# Patient Record
Sex: FEMALE | Race: BLACK OR AFRICAN AMERICAN | Employment: PART TIME | URBAN - METROPOLITAN AREA
[De-identification: names, ages, dates, MRNs, and addresses within clinical notes are randomized per-mention and may not be internally consistent; named-entity substitution may affect disease eponyms.]

---

## 2017-02-03 ENCOUNTER — HOSPITAL ENCOUNTER (EMERGENCY)
Age: 42
Discharge: HOME | End: 2017-02-03
Attending: EMERGENCY MEDICINE
Payer: SELF-PAY

## 2017-02-03 VITALS
WEIGHT: 184.13 LBS | OXYGEN SATURATION: 98 % | TEMPERATURE: 98.4 F | HEART RATE: 84 BPM | RESPIRATION RATE: 16 BRPM | DIASTOLIC BLOOD PRESSURE: 92 MMHG | SYSTOLIC BLOOD PRESSURE: 157 MMHG | BODY MASS INDEX: 28.9 KG/M2 | HEIGHT: 67 IN

## 2017-02-03 DIAGNOSIS — A53.0 POSITIVE RPR TEST: Primary | ICD-10-CM

## 2017-02-03 LAB — RPR SER-TITR: ABNORMAL {TITER}

## 2017-02-03 PROCEDURE — 99281 EMR DPT VST MAYX REQ PHY/QHP: CPT

## 2017-02-03 PROCEDURE — 86592 SYPHILIS TEST NON-TREP QUAL: CPT | Performed by: EMERGENCY MEDICINE

## 2017-02-03 PROCEDURE — 86593 SYPHILIS TEST NON-TREP QUANT: CPT | Performed by: EMERGENCY MEDICINE

## 2017-02-03 PROCEDURE — 36415 COLL VENOUS BLD VENIPUNCTURE: CPT | Performed by: EMERGENCY MEDICINE

## 2017-02-03 PROCEDURE — 86780 TREPONEMA PALLIDUM: CPT | Performed by: EMERGENCY MEDICINE

## 2017-02-03 NOTE — ED TRIAGE NOTES
Pt stated she had blood work done 3 weeks ago that showed she had syphilis, pt stated she has been having headaches x 3 months, denies fever, denies n/v

## 2017-02-03 NOTE — ED PROVIDER NOTES
HPI Comments: 38 y.o. female with no significant past medical history who presents from home with chief complaint abnormal blood test. Pt states that she had a blood test done 3 weeks ago when she was donating plasma and they sent her a letter notifying her that she had syphilis. She was last sexually active 2 years ago and she says her only symptoms since then have been some some small red circles above her lip that she has had on and off for the last year. She denies any other rashes but says she has a small bald spot on the top of her head. She was seen recently at another hospital for her HA's she has had for the last 3 months and had a head CT that was negative. She also asked about her lip sores and was told they were most likely age marks after no blood tests she says. She does not have a HA and says that she feels good currently. There are no other acute medical concerns at this time. PCP: PROVIDER UNKNOWN    Note written by Jules Colby, as dictated by Mariama Prieto MD 9:03 AM      The history is provided by the patient. No  was used. History reviewed. No pertinent past medical history. Past Surgical History:   Procedure Laterality Date    Hx  section      Hx other surgical       tumor removed from breast          History reviewed. No pertinent family history. Social History     Social History    Marital status: SINGLE     Spouse name: N/A    Number of children: N/A    Years of education: N/A     Occupational History    Not on file. Social History Main Topics    Smoking status: Not on file    Smokeless tobacco: Not on file    Alcohol use Not on file    Drug use: Not on file    Sexual activity: Not on file     Other Topics Concern    Not on file     Social History Narrative    No narrative on file         ALLERGIES: Review of patient's allergies indicates no known allergies.     Review of Systems   Constitutional: Negative for appetite change, chills and fever. HENT: Negative for rhinorrhea, sore throat and trouble swallowing. Eyes: Negative for photophobia. Respiratory: Negative for cough and shortness of breath. Cardiovascular: Negative for chest pain and palpitations. Gastrointestinal: Negative for abdominal pain, nausea and vomiting. Genitourinary: Negative for dysuria, frequency and hematuria. Musculoskeletal: Negative for arthralgias. Skin:        Small red circles above lip   Neurological: Positive for headaches. Negative for dizziness, syncope and weakness. Psychiatric/Behavioral: Negative for behavioral problems. The patient is not nervous/anxious. All other systems reviewed and are negative. Vitals:    02/03/17 0830   BP: (!) 157/92   Pulse: (!) 122   Resp: 16   Temp: 98.4 °F (36.9 °C)   SpO2: 98%   Weight: 83.5 kg (184 lb 2 oz)   Height: 5' 7\" (1.702 m)            Physical Exam   Constitutional: She appears well-developed and well-nourished. HENT:   Head: Normocephalic and atraumatic. Mouth/Throat: Oropharynx is clear and moist.   Eyes: EOM are normal. Pupils are equal, round, and reactive to light. Neck: Normal range of motion. Neck supple. Cardiovascular: Normal rate, regular rhythm, normal heart sounds and intact distal pulses. Exam reveals no gallop and no friction rub. No murmur heard. Pulmonary/Chest: Effort normal. No respiratory distress. She has no wheezes. She has no rales. Abdominal: Soft. There is no tenderness. There is no rebound. Musculoskeletal: Normal range of motion. She exhibits no tenderness. Neurological: She is alert. No cranial nerve deficit. Motor; symmetric   Skin: No erythema. Several small scars between nose and upper lip   Psychiatric: She has a normal mood and affect. Her behavior is normal.   Nursing note and vitals reviewed.    Note written by Jules Gary, as dictated by Kishan Cuevas MD 9:03 AM      [facility]  ED Course       Procedures Patient called back today 2-4-17 and she was given her RPR results; she was told to call the [facility] health Department Monday which is in 2 days and tell them that her RPR was positive at [facility] and also another syphilis test was positive at a blood bank in [facility]; patient was told that she probably needs penicillin injections and if health Department can see her in the next week or 2 that should be fine; the patient was told to return to the [facility] ER if the health department cannot see her in a timely manner.   12:43 PM

## 2017-02-03 NOTE — DISCHARGE INSTRUCTIONS
We hope that we have addressed all of your medical concerns. The examination and treatment you received in the Emergency Department were for an emergent problem and were not intended as complete care. It is important that you follow up with your healthcare provider(s) for ongoing care. If your symptoms worsen or do not improve as expected, and you are unable to reach your usual health care provider(s), you should return to the Emergency Department. Today's healthcare is undergoing tremendous change, and patient satisfaction surveys are one of the many tools to assess the quality of medical care. You may receive a survey from the [employer] regarding your experience in the Emergency Department. I hope that your experience has been completely positive, particularly the medical care that I provided. As such, please participate in the survey; anything less than excellent does not meet my expectations or intentions. [facility] and [address] [place] [address] participate in nationally recognized quality of care measures. If your blood pressure is greater than 120/80, as reported below, we urge that you seek medical care to address the potential of high blood pressure, commonly known as hypertension. Hypertension can be hereditary or can be caused by certain medical conditions, pain, stress, or \"white coat syndrome. \"       Please make an appointment with your health care provider(s) for follow up of your Emergency Department visit. VITALS:   Patient Vitals for the past 8 hrs:   Temp Pulse Resp BP SpO2   02/03/17 0830 98.4 °F (36.9 °C) (!) 122 16 (!) 157/92 98 %          Thank you for allowing us to provide you with medical care today. We realize that you have many choices for your emergency care needs. Please choose us in the future for any continued health care needs.       Makenzie Brandt MD    [facility], [facility]. Office: [phone]            No results found for this or any previous visit (from the past 24 hour(s)). No results found.

## 2017-02-06 LAB
RPR SER QL: REACTIVE
T PALLIDUM AB SER QL IA: NEGATIVE

## 2017-03-10 ENCOUNTER — HOSPITAL ENCOUNTER (EMERGENCY)
Age: 42
Discharge: HOME | End: 2017-03-10
Attending: STUDENT IN AN ORGANIZED HEALTH CARE EDUCATION/TRAINING PROGRAM
Payer: SELF-PAY

## 2017-03-10 VITALS
SYSTOLIC BLOOD PRESSURE: 157 MMHG | HEIGHT: 67 IN | WEIGHT: 175 LBS | OXYGEN SATURATION: 95 % | TEMPERATURE: 97.9 F | BODY MASS INDEX: 27.47 KG/M2 | RESPIRATION RATE: 18 BRPM | DIASTOLIC BLOOD PRESSURE: 103 MMHG | HEART RATE: 117 BPM

## 2017-03-10 DIAGNOSIS — R11.2 NAUSEA AND VOMITING, INTRACTABILITY OF VOMITING NOT SPECIFIED, UNSPECIFIED VOMITING TYPE: Primary | ICD-10-CM

## 2017-03-10 PROCEDURE — 99282 EMERGENCY DEPT VISIT SF MDM: CPT

## 2017-03-10 NOTE — ED NOTES
\" Ma-am I don't want to die, I cannot feel the left side of my face, my left eye is tight. I am nauseous all the time if I don't eat or even when I eat, but I'm not nauseous right now\". \" I was told a month ago I had syphllis I followed up with the Health Department and they told me it was negative but my titers are high\". \" I have bald spots in my head ,but nobody wants to take the time to pull my hair apart and look at them\".

## 2017-03-10 NOTE — ED TRIAGE NOTES
States she tested positive syphilis month ago and was told to follow up with Rheumatologist, Infectious Disease, and health department. \"I am scared for my life. \"  Has only followed up with health department. Here today for nausea x 1 month.

## 2017-03-10 NOTE — ED NOTES
Discharge instructions given to pt. All questions answered and pt verbalized understanding. Patient left prior to vital signs being obtained. Pt ambulatory out of unit.

## 2017-03-10 NOTE — ED PROVIDER NOTES
HPI Comments: 38 y.o. female with no significant past medical history who presents from home with chief complaint of left side HA. Pt reports that over the past few weeks she has had several different muscle pains and joint pains, most recently she has had 3 days of left side headache. She reports that she was seen at the health department recently and was told that she had some emma titers that she need to see a rheumatologist to have further evaluated, but in light of her recent intermittent left side headaches she came to the ED for evaluation and more information about the next steps in her work up. She denies vision changes, nausea, vomiting, dizziness, recent trauma. There are no other acute medical concerns at this time. PCP: PROVIDER UNKNOWN    Note written by guille Pascual, as dictated by Su Nichole MD 12:25 PM           History reviewed. No pertinent past medical history. Past Surgical History:   Procedure Laterality Date    HX  SECTION      HX OTHER SURGICAL      tumor removed from breast          History reviewed. No pertinent family history. Social History     Social History    Marital status: SINGLE     Spouse name: N/A    Number of children: N/A    Years of education: N/A     Occupational History    Not on file. Social History Main Topics    Smoking status: Current Every Day Smoker     Packs/day: 0.50    Smokeless tobacco: Not on file    Alcohol use No    Drug use: Yes     Special: Marijuana    Sexual activity: Not on file     Other Topics Concern    Not on file     Social History Narrative         ALLERGIES: Review of patient's allergies indicates no known allergies. Review of Systems   Constitutional: Negative for activity change, diaphoresis, fatigue and fever. HENT: Negative for congestion and sore throat. Eyes: Negative for photophobia and visual disturbance. Respiratory: Negative for chest tightness and shortness of breath. Cardiovascular: Negative for chest pain, palpitations and leg swelling. Gastrointestinal: Negative for abdominal pain, blood in stool, constipation, diarrhea, nausea and vomiting. Genitourinary: Negative for difficulty urinating, dysuria, flank pain, frequency and hematuria. Musculoskeletal: Negative for back pain. Neurological: Positive for headaches. Negative for dizziness, syncope and numbness. Vitals:    03/10/17 1021   BP: (!) 157/103   Pulse: (!) 117   Resp: 18   Temp: 97.9 °F (36.6 °C)   SpO2: 95%   Weight: 79.4 kg (175 lb)   Height: 5' 7\" (1.702 m)            Physical Exam   Constitutional: She is oriented to person, place, and time. She appears well-developed and well-nourished. No distress. HENT:   Head: Normocephalic and atraumatic. Nose: Nose normal.   Mouth/Throat: Oropharynx is clear and moist. No oropharyngeal exudate. Eyes: Conjunctivae and EOM are normal. Right eye exhibits no discharge. Left eye exhibits no discharge. No scleral icterus. Neck: Normal range of motion. Neck supple. No JVD present. No tracheal deviation present. No thyromegaly present. Cardiovascular: Normal rate, regular rhythm, normal heart sounds and intact distal pulses. Exam reveals no gallop and no friction rub. No murmur heard. Pulmonary/Chest: Effort normal and breath sounds normal. No stridor. No respiratory distress. She has no wheezes. She has no rales. She exhibits no tenderness. Abdominal: Bowel sounds are normal. She exhibits no distension and no mass. There is no tenderness. There is no rebound. Musculoskeletal: Normal range of motion. She exhibits no edema or tenderness. Lymphadenopathy:     She has no cervical adenopathy. Neurological: She is alert and oriented to person, place, and time. No cranial nerve deficit. Coordination normal.   Skin: Skin is warm and dry. No rash noted. She is not diaphoretic. No erythema. No pallor. Psychiatric: She has a normal mood and affect.  Her behavior is normal. Judgment and thought content normal.    Note written by guille Pascual, as dictated by Su Nichole MD 12:27 PM      MDM  Number of Diagnoses or Management Options  Nausea and vomiting, intractability of vomiting not specified, unspecified vomiting type:   Diagnosis management comments: N/V, abd pain, HA, psychosis. 39 y/o presenting with multiple complaints on exam, the most is that \" I have syphillis and they told me I have high titers. \"  Will obtain records from health department, patient also complaining of n/v. Will await health dept records and reasess. Amount and/or Complexity of Data Reviewed  Clinical lab tests: reviewed  Review and summarize past medical records: yes    Risk of Complications, Morbidity, and/or Mortality  Presenting problems: moderate  Diagnostic procedures: moderate  Management options: moderate    Patient Progress  Patient progress: stable    ED Course       Procedures  12:46 PM  Reviewed records from [facility] which showed reactive RPR, but non-reactive TP-PA. Based on literature guide lines from [facility] and in setting of patient's recent clinical history, this is indicative of recently treated syphillis infection.

## 2017-03-10 NOTE — DISCHARGE INSTRUCTIONS
We hope that we have addressed all of your medical concerns. The examination and treatment you received in the Emergency Department were for an emergent problem and were not intended as complete care. It is important that you follow up with your healthcare provider(s) for ongoing care. If your symptoms worsen or do not improve as expected, and you are unable to reach your usual health care provider(s), you should return to the Emergency Department. Today's healthcare is undergoing tremendous change, and patient satisfaction surveys are one of the many tools to assess the quality of medical care. You may receive a survey from the [employer] regarding your experience in the Emergency Department. I hope that your experience has been completely positive, particularly the medical care that I provided. As such, please participate in the survey; anything less than excellent does not meet my expectations or intentions. [facility] and [address] [place] [address] participate in nationally recognized quality of care measures. If your blood pressure is greater than 120/80, as reported below, we urge that you seek medical care to address the potential of high blood pressure, commonly known as hypertension. Hypertension can be hereditary or can be caused by certain medical conditions, pain, stress, or \"white coat syndrome. \"       Please make an appointment with your health care provider(s) for follow up of your Emergency Department visit. VITALS:   Patient Vitals for the past 8 hrs:   Temp Pulse Resp BP SpO2   03/10/17 1021 97.9 °F (36.6 °C) (!) 117 18 (!) 157/103 95 %          Thank you for allowing us to provide you with medical care today. We realize that you have many choices for your emergency care needs. Please choose us in the future for any continued health care needs. Nava Crespo, [address] [place] [address] [address] Office: [phone]            No results found for this or any previous visit (from the past 24 hour(s)). No results found. Nausea and Vomiting: Care Instructions  Your Care Instructions    When you are nauseated, you may feel weak and sweaty and notice a lot of saliva in your mouth. Nausea often leads to vomiting. Most of the time you do not need to worry about nausea and vomiting, but they can be signs of other illnesses. Two common causes of nausea and vomiting are stomach flu and food poisoning. Nausea and vomiting from viral stomach flu will usually start to improve within 24 hours. Nausea and vomiting from food poisoning may last from 12 to 48 hours. The doctor has checked you carefully, but problems can develop later. If you notice any problems or new symptoms, get medical treatment right away. Follow-up care is a key part of your treatment and safety. Be sure to make and go to all appointments, and call your doctor if you are having problems. It's also a good idea to know your test results and keep a list of the medicines you take. How can you care for yourself at home? · To prevent dehydration, drink plenty of fluids, enough so that your urine is light yellow or clear like water. Choose water and other caffeine-free clear liquids until you feel better. If you have kidney, heart, or liver disease and have to limit fluids, talk with your doctor before you increase the amount of fluids you drink. · Rest in bed until you feel better. · When you are able to eat, try clear soups, mild foods, and liquids until all symptoms are gone for 12 to 48 hours. Other good choices include dry toast, crackers, cooked cereal, and gelatin dessert, such as Jell-O. When should you call for help? Call 911 anytime you think you may need emergency care. For example, call if:  · You passed out (lost consciousness).   Call your doctor now or seek immediate medical care if:  · You have symptoms of dehydration, such as:  ¨ Dry eyes and a dry mouth. ¨ Passing only a little dark urine. ¨ Feeling thirstier than usual.  · You have new or worsening belly pain. · You have a new or higher fever. · You vomit blood or what looks like coffee grounds. Watch closely for changes in your health, and be sure to contact your doctor if:  · You have ongoing nausea and vomiting. · Your vomiting is getting worse. · Your vomiting lasts longer than 2 days. · You are not getting better as expected. Where can you learn more? Go to http://daniela-dell.info/. Enter 25 [id number] in the search box to learn more about \"Nausea and Vomiting: Care Instructions. \"  Current as of: May 27, 2016  Content Version: 11.1  © 2669-9094 [employer]. Care instructions adapted under license by [employer] (which disclaims liability or warranty for this information). If you have questions about a medical condition or this instruction, always ask your healthcare professional. [place] [address] any warranty or liability for your use of this information.

## 2017-03-15 ENCOUNTER — TELEPHONE (OUTPATIENT)
Dept: RHEUMATOLOGY | Age: 42
End: 2017-03-15

## 2017-03-16 ENCOUNTER — OFFICE VISIT (OUTPATIENT)
Dept: RHEUMATOLOGY | Age: 42
End: 2017-03-16

## 2017-03-16 VITALS
SYSTOLIC BLOOD PRESSURE: 128 MMHG | RESPIRATION RATE: 18 BRPM | BODY MASS INDEX: 27.94 KG/M2 | OXYGEN SATURATION: 94 % | DIASTOLIC BLOOD PRESSURE: 87 MMHG | HEIGHT: 67 IN | HEART RATE: 83 BPM | TEMPERATURE: 97.4 F | WEIGHT: 178 LBS

## 2017-03-16 DIAGNOSIS — R53.82 CHRONIC FATIGUE: ICD-10-CM

## 2017-03-16 DIAGNOSIS — Z72.0 TOBACCO ABUSE: ICD-10-CM

## 2017-03-16 DIAGNOSIS — M35.9 UNDIFFERENTIATED CONNECTIVE TISSUE DISEASE (HCC): Primary | ICD-10-CM

## 2017-03-16 DIAGNOSIS — L65.9 NON-SCARRING ALOPECIA: ICD-10-CM

## 2017-03-16 DIAGNOSIS — M25.511 STERNOCLAVICULAR JOINT PAIN, RIGHT: ICD-10-CM

## 2017-03-16 DIAGNOSIS — L30.9 DERMATITIS: ICD-10-CM

## 2017-03-16 DIAGNOSIS — R76.8 BIOLOGICAL FALSE POSITIVE RPR TEST: ICD-10-CM

## 2017-03-16 NOTE — MR AVS SNAPSHOT
Visit Information Date & Time Provider Department Dept. Phone Encounter #  
 3/16/2017 11:00 AM Shelley Lara MD [facility] [id number] Follow-up Instructions Return in about 2 weeks (around 3/30/2017). Upcoming Health Maintenance Date Due Pneumococcal 19-64 Medium Risk (1 of 1 - PPSV23) 1/3/1992 DTaP/Tdap/Td series (1 - Tdap) 1/3/1994 PAP AKA CERVICAL CYTOLOGY 1/3/1994 INFLUENZA AGE 9 TO ADULT 8/1/2016 Allergies as of 3/16/2017  Review Complete On: 3/16/2017 By: Shelley Lara MD  
 No Known Allergies Current Immunizations  Never Reviewed No immunizations on file. Not reviewed this visit You Were Diagnosed With   
  
 Codes Comments Undifferentiated connective tissue disease ([address]    -  Primary ICD-10-CM: M35.9 ICD-9-CM: 710.9 Biological false positive RPR test     ICD-10-CM: R76.8 ICD-9-CM: 795.6 Non-scarring alopecia     ICD-10-CM: L65.9 ICD-9-CM: 704.09 Dermatitis     ICD-10-CM: L30.9 ICD-9-CM: 692.9 Sternoclavicular joint pain, right     ICD-10-CM: M25.511 ICD-9-CM: 719.41 Tobacco abuse     ICD-10-CM: Z72.0 ICD-9-CM: 305.1 Vitals BP Pulse Temp Resp Height(growth percentile) Weight(growth percentile) 128/87 (BP 1 Location: Right arm, BP Patient Position: Sitting) 83 97.4 °F (36.3 °C) 18 5' 7\" (1.702 m) 178 lb (80.7 kg) SpO2 BMI OB Status Smoking Status 94% 27.88 kg/m2 Postmenopausal Current Every Day Smoker BMI and BSA Data Body Mass Index Body Surface Area  
 27.88 kg/m 2 1.95 m 2 Your Updated Medication List  
  
Notice  As of 3/16/2017 11:56 AM  
 You have not been prescribed any medications. We Performed the Following ANTI-SMOOTH MUSCLE/MITOCHOND. [02663 CPT(R)] ANTINUCLEAR ANTIBODIES, IFA [id number] CPT(R)] BETA-2 GLYCOPROTEIN I ABS [id number] CPT(R)] C REACTIVE PROTEIN, QT [94351 CPT(R)] CARDIOLIPIN AB PANEL [id number] CPT(R)] CBC WITH AUTOMATED DIFF [55391 CPT(R)] CENTROMERE B AB [id number] Custom] CHOLESTEROL, TOTAL [67218 CPT(R)] CHRONIC HEPATITIS PANEL [[id number] Custom] COMPLEMENT, C3 & C4 [id number] Custom] COMPLEMENT, CH50, TOTAL [60977 CPT(R)] DHEA [54317 CPT(R)] DIRECT ALLISON [id number] CPT(R)] DSDNA (NDNA) SCRN BY CLAUDINE [[id number] Custom] HISTONE AB [id number] CPT(R)] HOMOCYST(E)INE, PLASMA [id number] CPT(R)] IMMUNOGLOBULINS, G/A/M, QT. [59765 CPT(R)] METABOLIC PANEL, COMPREHENSIVE [76775 CPT(R)] PROT+CREATU (RANDOM) [id number] CPT(R)] PROTEIN ELECTROPHORESIS W/ REFLX LEI [[id number] Custom] RNP ANTIBODIES [id number] CPT(R)] RPR W/REFLEX TITER AND CONFIRMATION [[id number] Custom] SED RATE (ESR) V4019046 CPT(R)] SJOGREN'S ABS, SSA AND SSB [[id number] Custom] SMITH ANTIBODIES [[id number] Custom] UA/M W/RFLX CULTURE, ROUTINE [[id number] Custom] VITAMIN D, 25 HYDROXY [id number] CPT(R)] Follow-up Instructions Return in about 2 weeks (around 3/30/2017). To-Do List   
 03/16/2017 Imaging:  MRI CHEST W WO CONT   
  
 03/16/2017 Imaging:  XR CHEST PA LAT Patient Instructions Labs today X-rays today I ordered an MRI of your chest. Please call the Patient Care Scheduling Team [phone] to schedule your test. 
 
 
  
Introducing [facility]! Davonte Amado introduces [employer] patient portal. Now you can access parts of your medical record, email your doctor's office, and request medication refills online. 1. In your internet browser, go to [email or web address] 2. Click on the First Time User? Click Here link in the Sign In box. You will see the New Member Sign Up page. 3. Enter your MyChart Access Code exactly as it appears below. You will not need to use this code after youve completed the sign-up process. If you do not sign up before the expiration date, you must request a new code. · [employer] Access Code: [email or web address] Expires: 5/4/2017  9:47 AM 
 
4. Enter the last four digits of your Social Security Number (xxxx) and Date of Birth (mm/dd/yyyy) as indicated and click Submit. You will be taken to the next sign-up page. 5. Create a [employer] ID. This will be your [employer] login ID and cannot be changed, so think of one that is secure and easy to remember. 6. Create a [employer] password. You can change your password at any time. 7. Enter your Password Reset Question and Answer. This can be used at a later time if you forget your password. 8. Enter your e-mail address. You will receive e-mail notification when new information is available in [address] 9. Click Sign Up. You can now view and download portions of your medical record. 10. Click the Download Summary menu link to download a portable copy of your medical information. If you have questions, please visit the Frequently Asked Questions section of the [employer] website. Remember, [employer] is NOT to be used for urgent needs. For medical emergencies, dial 911. Now available from your iPhone and Android! Please provide this summary of care documentation to your next provider. Your primary care clinician is listed as PROVIDER UNKNOWN. If you have any questions after today's visit, please call [phone].

## 2017-03-16 NOTE — PATIENT INSTRUCTIONS
Labs today    X-rays today    I ordered an MRI of your chest. Please call the Patient Care Scheduling Team [phone] to schedule your test.

## 2017-03-16 NOTE — PROGRESS NOTES
REASON FOR VISIT    This is the initial evaluation for Ms. Lawyer Nieves a 38 y.o.  female for question of a systemic lupus erythematosus. The patient is referred to the [facility] and [address] [place] [address] at the request of Dr. Serina Prader. HISTORY OF PRESENT ILLNESS      Records reviewed from [address] [place] [address]. In 10/02/2011, chest radiograph showed the heart is not enlarged.  The mediastinal structures are normal and the lung   fields appear clear.  Previously noted right perihilar infiltrate has resolved. In 7/01/2014, labs showed negative HBsAb and Quantiferon TB    In 7/20/2016, CT Head without contrast done due to headaches showed there is no acute intracranial hemorrhage, midline shift, mass effect or extra-axial fluid collection. Gray-white differentiation is preserved without CT evidence for acute territorial infarction. Brain volume and ventricular system are within normal limits for age. The skull base and calvarium are intact. Partial opacification right sphenoid sinus present. The visualized orbits, globes, and mastoid air cells are unremarkable. She reports a history of small red circles above her lip that waxed and wanted over the past year and a small bald spot on the top of her head. In 2015, she developed red lesions on her upper lips that could be pruritic. She also had under her left eye. These lesions are constant. No known exacerbating factors. Sunlight does not effect it. She also notes having small red lesions that erupt on her arms. In 10/09/2015, a thyroid ultrasound showed Right lobe: 3.8 cm x 1.6 cm x 1.5 cm. There is scattered small nodules.  There is a 1.0 cm nodule in the upper pole. There is a cystic process adjacent to the lower pole of the right lobe. This measures 1.7 cm.  The etiology of this is uncertain.  I would suggest follow up with CT scan of the neck with contrast. Left lobe:  4.2 cm x 1.5 cm x 1.4 cm.  There is a tiny nodule in the mid pole measuring 0.2 cm. Isthmus:  0.42 cm. Mammogram showed there are scattered areas of fibroglandular densities. There is an oval mass measuring 7 millimeters with obscured margins in the middle region of the  left breast lower outer quadrant at 4 o'clock. In the right breast, no suspicious masses, calcifications or other abnormalities are seen a mass in the left breast requires additional evaluation.      In 5/2016, she developed a tender lesion on posterior scalp. She also developed right sternoclavicular pain and swelling. In 6/2016, her grand daugther was fixing her hair and noticed a bald spot. In 2/03/2017, labs showed positive RPR 1:16, with negative TP-PA    In 2/27/2017, labs showed negative HIV, positive RPR 1:32 with negative TP-PA    Intermittentli she develops a painful tightness/numbness on her left side of her face. Arlyn Slay exposure makes that worse. She is works a CNA at a nursing aide. She is a smoker since the age of 13    REVIEW OF SYSTEMS    A 15 point review of systems was performed and summarized below. The questionnaire was reviewed with the patient and scanned into the patient's medical record.     General: endorses fatigue, weakness, denies recent weight gain, recent weight loss, fever, night sweats  Musculoskeletal: denies morning stiffness, joint pain, joint swelling, muscle weakness  Ears: denies ringing in ears, loss of hearing  Eyes: denies pain, redness, loss of vision, double vision, blurred vision, dryness, foreign body sensation  Mouth: denies sore tongue, oral ulcers, bleeding gums, loss of taste, dryness, increased dental caries  Nose: denies nosebleeds, loss of smell, nasal ulcers  Throat: endorses hoarseness, denies frequent sore throats, difficulty in swallowing, pain in jaw while chewing  Neck: denies swollen glands, tender glands  Cardiopulmonary: endorses sudden changes in heart beat, swollen legs or feet, cough, wheezing, denies pain in chest, irregular heart beat, shortness of breath, difficulty breathing at night, coughing of blood  Gastrointestinal: endorses nausea, denies heartburn, stomach pain relieved by food, vomiting of blood/\"coffee grounds\", jaundice, increasing constipation, persistent diarrhea, blood in stools, black stools  Genitourinary: endorses getting up at night to pass urine, denies difficult urination, pain or burning on urination, blood in urine, cloudy urine, pus in urine, genital discharge, frequent urination, vaginal dryness, rash/ulcers, sexual difficulties   Hematologic: denies anemia, bleeding tendency, blood clots  Skin: endorses hives, skin tightness, nodules/bumps, hair loss, denies easy bruising, redness, rash, sun sensitive, color changes of hands or feet in the cold (Raynaud's)  Neurologic: endorses headaches, dizziness, numbness or tingling in hands/feet, muscle weakness, denies memory loss,  fainting of loss of consciousness  Psychiatric: endorses depression, excessive worries  Sleep: endorses poor sleep (5-6 hours), snoring, daytime somnolence, difficulty falling asleep, denies difficulty staying asleep     PAST MEDICAL HISTORY    She has no past medical history on file. FAMILY HISTORY    Her family history includes Diabetes in her mother and paternal grandmother; Hypertension in her paternal grandmother and sister. SOCIAL HISTORY    She reports that she has been smoking. She has been smoking about 0.50 packs per day. She does not have any smokeless tobacco history on file. She reports that she uses illicit drugs, including Marijuana. She reports that she does not drink alcohol. GYNECOLOGIC HISTORY     8, Para 5, Living 5, Miscarriage 3 @ 2 around 6 weeks and 1 arounds 6 months    She denies severe pre-eclampsia, eclampsia or placental insufficiency    HEALTH MAINTENANCE    Immunizations    There is no immunization history on file for this patient.     MEDICATIONS    None    ALLERGIES    No Known Allergies    PHYSICAL EXAMINATION    Visit Vitals    /87 (BP 1 Location: Right arm, BP Patient Position: Sitting)    Pulse 83    Temp 97.4 °F (36.3 °C)    Resp 18    Ht 5' 7\" (1.702 m)    Wt 178 lb (80.7 kg)    SpO2 94%    BMI 27.88 kg/m2     Body mass index is 27.88 kg/(m^2). General: Patient is alert, oriented x 3, not in acute distress    HEENT:   Conjunctiva are not injected and appear moist, oral mucous membranes are moist, there are no ulcers present, there is non-scarring alopecia, neck is supple, there is no lymphadenopathy    Cardiovascular:  Heart is regular rate and rhythm, no murmurs. Chest:  Lungs are clear to auscultation bilaterally. Abdomen:  Soft, non-tender    Extremities:  Free of clubbing, cyanosis, edema, extremities well perfused. Neurological exam:  No focal sensory deficits, muscle strength is full in upper and lower extremities. Skin exam:  There are no active Raynaud's, no livedo reticularis, no periungual erythema. Non-scarring alopecia. 3/16/2017. Courtesy of Aniyah Parikh MD, [facility] [place]    Musculoskeletal exam:  A comprehensive musculoskeletal exam was performed for all joints of each upper and lower extremity and assessed for swelling, tenderness and range of motion. Pertinent results are documented as below:    Right SC joint swelling and tenderness. Enlarged and Tender Right SC Joint. 3/16/2017. Courtesy of Aniyah Parikh MD, [facility] [place]      Enlarged and Tender Right SC Joint. 3/16/2017. Courtesy of Aniyah Parikh MD, [facility] [place]    DATA REVIEW    Studies Reviewed:     Prior medical records were reviewed and are summarized as below:    Laboratory data: summarized in the HPI    Imaging: summarized in the HPI. ASSESSMENT AND PLAN    1) Undifferentiated Connective Tissue Disease. The diagnosis of undifferentiated connective tissue disease is based on her non-scarring alopecia, false positive RPR, right sternoclavicular swelling,.  She does not meet 4 of the 11 of [employer] of Rheumatology criteria or SLICC classification criteria for systemic lupus erythematosus (SLE). Approximately 10% of patients with undifferentiated connective tissue disease can progress to systemic lupus erythematosus. Early initiation of hydroxychloroquine has been associated with delayed SLE onset Mounika Stanton et al. Lupus 2007). Nabil Almanza 2) False Positive RPR. She does not have syphilis. This an immunogenic positive. See #1. 3) Right Sternoclavicular Joint Swelling. I ordered a chest radiograph and MRI. 4) Breast Mass. This was seen in 10/2015. She needs follow up imaging. 5) Chronic Fatigue. I will check her TSH. 6) Tobacco Abuse. She is a long standing smoker. I counseled cessation. 7) Dermatitis. I am uncertain of the etiology of her rash. It does not fit acute cutaneous lupus. The patient voiced understanding of the aforementioned assessment and plan. Summary of plan was provided in the After Visit Summary patient instructions. I also provided education about MyChart setup and utility. TODAY'S ORDERS    Orders Placed This Encounter    XR CHEST PA LAT    MRI CHEST W WO CONT    ANTINUCLEAR ANTIBODIES, IFA    CENTROMERE B AB    HISTONE AB    RNP ANTIBODIES    SJOGREN'S ABS, SSA AND SSB    SMITH ANTIBODIES    ANTI-SMOOTH MUSCLE/MITOCHOND.     COMPLEMENT, CH50, TOTAL    CHRONIC HEPATITIS PANEL    DHEA    BETA-2 GLYCOPROTEIN I ABS    CARDIOLIPIN AB PANEL    DSDNA (NDNA) SCRN BY CRITHIDIA    COMPLEMENT, C3 & C4    CBC WITH AUTOMATED DIFF    CHOLESTEROL, TOTAL    METABOLIC PANEL, COMPREHENSIVE    C REACTIVE PROTEIN, QT    SED RATE (ESR)    HOMOCYST(E)INE, PLASMA    RPR W/REFLEX TITER AND CONFIRMATION    IMMUNOGLOBULINS, G/A/M, QT.    PROTEIN ELECTROPHORESIS W/ REFLX LEI    UA/M W/RFLX CULTURE, ROUTINE    PROT+CREATU (RANDOM)    VITAMIN D, 25 HYDROXY    TSH REFLEX TO T4    THYROID ANTIBODY PANEL    DIRECT ALLISON     Future Appointments  Date Time Provider Department Center   4/3/2017 1:40 PM MD Radha Sprague MD, [facility]    Adult Rheumatology   Musculoskeletal Ultrasound Certified  [address] [place] [address]   [id number] [place] [address], [address] [place] [address] Road   Phone [phone]  Fax [phone]

## 2017-03-17 LAB
APPEARANCE UR: CLEAR
BACTERIA #/AREA URNS HPF: NORMAL /[HPF]
BILIRUB UR QL STRIP: NEGATIVE
CASTS URNS QL MICRO: NORMAL /LPF
CH50 SERPL-ACNC: >60 U/ML (ref 42–60)
COLOR UR: YELLOW
CREAT UR-MCNC: 42.5 MG/DL
EPI CELLS #/AREA URNS HPF: NORMAL /HPF
GLUCOSE UR QL: NEGATIVE
HGB UR QL STRIP: NEGATIVE
KETONES UR QL STRIP: NEGATIVE
LEUKOCYTE ESTERASE UR QL STRIP: NEGATIVE
MICRO URNS: NORMAL
MICRO URNS: NORMAL
NITRITE UR QL STRIP: NEGATIVE
PH UR STRIP: 6.5 [PH] (ref 5–7.5)
PROT UR QL STRIP: NEGATIVE
PROT UR-MCNC: 4.9 MG/DL
PROT/CREAT UR: 115 MG/G CREAT (ref 0–200)
RBC #/AREA URNS HPF: NORMAL /HPF
SP GR UR: 1.01 (ref 1–1.03)
URINALYSIS REFLEX, 377202: NORMAL
UROBILINOGEN UR STRIP-MCNC: 0.2 MG/DL (ref 0.2–1)
WBC #/AREA URNS HPF: NORMAL /HPF

## 2017-03-23 ENCOUNTER — TELEPHONE (OUTPATIENT)
Dept: RHEUMATOLOGY | Age: 42
End: 2017-03-23

## 2017-03-24 NOTE — TELEPHONE ENCOUNTER
Spoke with pt and let her know that her lab results have not all resulted yet and that we will call her with anything abnormal once Dr. Emmy Wright reviews them. She stated an understanding and gave me her new address to update in the system.

## 2017-03-31 LAB
25(OH)D3+25(OH)D2 SERPL-MCNC: 21.6 NG/ML (ref 30–100)
ACTIN IGG SERPL-ACNC: 19 UNITS (ref 0–19)
ALBUMIN SERPL ELPH-MCNC: 3.7 G/DL (ref 2.9–4.4)
ALBUMIN SERPL-MCNC: 4.4 G/DL (ref 3.5–5.5)
ALBUMIN/GLOB SERPL: 1 {RATIO} (ref 0.7–1.7)
ALBUMIN/GLOB SERPL: 1.5 {RATIO} (ref 1.2–2.2)
ALP SERPL-CCNC: 84 IU/L (ref 39–117)
ALPHA1 GLOB SERPL ELPH-MCNC: 0.3 G/DL (ref 0–0.4)
ALPHA2 GLOB SERPL ELPH-MCNC: 0.9 G/DL (ref 0.4–1)
ALT SERPL-CCNC: 14 IU/L (ref 0–32)
ANA TITR SER IF: NEGATIVE {TITER}
AST SERPL-CCNC: 18 IU/L (ref 0–40)
B-GLOBULIN SERPL ELPH-MCNC: 1.2 G/DL (ref 0.7–1.3)
B2 GLYCOPROT1 IGA SER-ACNC: <9 GPI IGA UNITS (ref 0–25)
B2 GLYCOPROT1 IGG SER-ACNC: <9 GPI IGG UNITS (ref 0–20)
B2 GLYCOPROT1 IGM SER-ACNC: <9 GPI IGM UNITS (ref 0–32)
BASOPHILS # BLD AUTO: 0.1 X10E3/UL (ref 0–0.2)
BASOPHILS NFR BLD AUTO: 1 %
BILIRUB SERPL-MCNC: 0.2 MG/DL (ref 0–1.2)
BUN SERPL-MCNC: 8 MG/DL (ref 6–24)
BUN/CREAT SERPL: 13 (ref 9–23)
C3 SERPL-MCNC: 131 MG/DL (ref 82–167)
C4 SERPL-MCNC: 23 MG/DL (ref 14–44)
CALCIUM SERPL-MCNC: 9.8 MG/DL (ref 8.7–10.2)
CARDIOLIPIN IGA SER IA-ACNC: <9 APL U/ML (ref 0–11)
CARDIOLIPIN IGG SER IA-ACNC: <9 GPL U/ML (ref 0–14)
CARDIOLIPIN IGM SER IA-ACNC: 58 MPL U/ML (ref 0–12)
CENTROMERE B AB SER-ACNC: <0.2 AI (ref 0–0.9)
CHLORIDE SERPL-SCNC: 98 MMOL/L (ref 96–106)
CHOLEST SERPL-MCNC: 216 MG/DL (ref 100–199)
CO2 SERPL-SCNC: 24 MMOL/L (ref 18–29)
COMMENT, 144067: NORMAL
CREAT SERPL-MCNC: 0.62 MG/DL (ref 0.57–1)
CRP SERPL-MCNC: 2.1 MG/L (ref 0–4.9)
DAT POLY-SP REAG RBC QL: NEGATIVE
DHEA SERPL-MCNC: 149 NG/DL (ref 31–701)
DSDNA (NDNA) SCRN BY CRITHIDIA: NORMAL
ENA RNP AB SER-ACNC: <0.2 AI (ref 0–0.9)
ENA SM AB SER-ACNC: <0.2 AI (ref 0–0.9)
ENA SS-A AB SER-ACNC: 0.5 AI (ref 0–0.9)
ENA SS-B AB SER-ACNC: <0.2 AI (ref 0–0.9)
EOSINOPHIL # BLD AUTO: 0.2 X10E3/UL (ref 0–0.4)
EOSINOPHIL NFR BLD AUTO: 2 %
ERYTHROCYTE [DISTWIDTH] IN BLOOD BY AUTOMATED COUNT: 12.8 % (ref 12.3–15.4)
ERYTHROCYTE [SEDIMENTATION RATE] IN BLOOD BY WESTERGREN METHOD: 15 MM/HR (ref 0–32)
GAMMA GLOB SERPL ELPH-MCNC: 1.2 G/DL (ref 0.4–1.8)
GLOBULIN SER CALC-MCNC: 2.9 G/DL (ref 1.5–4.5)
GLOBULIN SER CALC-MCNC: 3.6 G/DL (ref 2.2–3.9)
GLUCOSE SERPL-MCNC: 105 MG/DL (ref 65–99)
HBV CORE AB SERPL QL IA: NEGATIVE
HBV CORE IGM SERPL QL IA: NEGATIVE
HBV E AB SERPL QL IA: NEGATIVE
HBV E AG SERPL QL IA: NEGATIVE
HBV SURFACE AB SER QL: REACTIVE
HBV SURFACE AG SERPL QL IA: NEGATIVE
HCT VFR BLD AUTO: 44.4 % (ref 34–46.6)
HCV AB S/CO SERPL IA: <0.1 S/CO RATIO (ref 0–0.9)
HCYS SERPL-SCNC: 11.2 UMOL/L (ref 0–15)
HGB BLD-MCNC: 15.3 G/DL (ref 11.1–15.9)
HISTONE IGG SER IA-ACNC: 0.4 UNITS (ref 0–0.9)
IGA SERPL-MCNC: 248 MG/DL (ref 87–352)
IGG SERPL-MCNC: 988 MG/DL (ref 700–1600)
IGM SERPL-MCNC: 351 MG/DL (ref 26–217)
IMM GRANULOCYTES # BLD: 0 X10E3/UL (ref 0–0.1)
IMM GRANULOCYTES NFR BLD: 0 %
LYMPHOCYTES # BLD AUTO: 4.1 X10E3/UL (ref 0.7–3.1)
LYMPHOCYTES NFR BLD AUTO: 57 %
M PROTEIN SERPL ELPH-MCNC: NORMAL G/DL
MCH RBC QN AUTO: 33.6 PG (ref 26.6–33)
MCHC RBC AUTO-ENTMCNC: 34.5 G/DL (ref 31.5–35.7)
MCV RBC AUTO: 97 FL (ref 79–97)
MITOCHONDRIA M2 IGG SER-ACNC: 4.7 UNITS (ref 0–20)
MONOCYTES # BLD AUTO: 0.5 X10E3/UL (ref 0.1–0.9)
MONOCYTES NFR BLD AUTO: 7 %
NEUTROPHILS # BLD AUTO: 2.4 X10E3/UL (ref 1.4–7)
NEUTROPHILS NFR BLD AUTO: 33 %
PLATELET # BLD AUTO: 370 X10E3/UL (ref 150–379)
PLEASE NOTE, 011150: NORMAL
POTASSIUM SERPL-SCNC: 4.3 MMOL/L (ref 3.5–5.2)
PROT PATTERN SERPL ELPH-IMP: NORMAL
PROT SERPL-MCNC: 7.3 G/DL (ref 6–8.5)
RBC # BLD AUTO: 4.56 X10E6/UL (ref 3.77–5.28)
RPR SER QL: REACTIVE
RPR SER-TITR: ABNORMAL {TITER}
SODIUM SERPL-SCNC: 139 MMOL/L (ref 134–144)
T PALLIDUM AB SER QL IF: NON REACTIVE
THYROGLOB AB SERPL-ACNC: <1 IU/ML (ref 0–0.9)
THYROPEROXIDASE AB SERPL-ACNC: 15 IU/ML (ref 0–34)
TSH SERPL DL<=0.005 MIU/L-ACNC: 0.77 UIU/ML (ref 0.45–4.5)
WBC # BLD AUTO: 7.2 X10E3/UL (ref 3.4–10.8)

## 2017-04-03 ENCOUNTER — TELEPHONE (OUTPATIENT)
Dept: RHEUMATOLOGY | Age: 42
End: 2017-04-03

## 2017-04-03 NOTE — PROGRESS NOTES
The results were reviewed and a letter was sent. Labs showed false positive RPR and high titer anti-cardiolipin IgM. No history on thrombosis but had one miscarriage at 6 months. Will start on Aspirin and smoking cessation counseling. Anti-dsDNA was cancelled. vitamin D low. Will discuss on follow up today.

## 2017-04-03 NOTE — TELEPHONE ENCOUNTER
----- Message from Mike Blue MD sent at 4/3/2017  2:37 PM EDT -----  Regarding: RE: Dr. Britton Bernal   She no showed today.    ----- Message -----     From: Chad Carcamo LPN     Sent: 8/90/2787  10:06 AM       To: Mike Blue MD  Subject: FW: Dr. Gertrudis Hernandez did not have a enough blood to run NDNA ([place]) test. Other labs were done, and will be sending the results. ----- Message -----     From: Jack Lozano     Sent: 3/31/2017   9:15 AM       To: [facility] Nurse Pool  Subject: RE: Dr. Britton Bernal                          Hi,   This came to us up front. Thanks,  Jennifer Howard   ----- Message -----     From: Neeta Pollock     Sent: 3/30/2017   4:07 PM       To: [facility] Office Pool  Subject: Dr. Kurtis Lewis, from [facility] ([phone], ext [phone]) requesting a call back from the doctor or , in regards to the pt blood work.

## 2017-04-19 ENCOUNTER — TELEPHONE (OUTPATIENT)
Dept: RHEUMATOLOGY | Age: 42
End: 2017-04-19

## 2017-04-20 ENCOUNTER — TELEPHONE (OUTPATIENT)
Dept: RHEUMATOLOGY | Age: 42
End: 2017-04-20

## 2017-04-20 RX ORDER — HYDROXYCHLOROQUINE SULFATE 200 MG/1
400 TABLET, FILM COATED ORAL DAILY
Qty: 180 TAB | Refills: 0 | Status: SHIPPED | OUTPATIENT
Start: 2017-04-20

## 2017-04-20 NOTE — TELEPHONE ENCOUNTER
Pt called asking what she can take for the pain that she has in her head? She said that it is a shooting pain and she feels like she has an infection in her head. Please advise.

## 2017-04-20 NOTE — TELEPHONE ENCOUNTER
Spoke with pt regarding her lab results and told her that Dr. Michael Falcon would like to start her on Plaquenil 400mg daily and aspirin 81mg daily. She stated that she currently does not have transportation or insurance so this is why she has not made her appts recently. She will find a way to get her plaquenil at [facility] and when she gets things together in her personal life she will call back and make an appt to be seen. Information was given to her for the care card and she stated that she has already applied but that transportation is the issue for her at this time.

## 2017-04-21 ENCOUNTER — TELEPHONE (OUTPATIENT)
Dept: RHEUMATOLOGY | Age: 42
End: 2017-04-21

## 2017-04-21 NOTE — TELEPHONE ENCOUNTER
Spoke with pt and let her know that she needs to go see a PCP (told her to go to a [facility]) to be evaluated for the pains in her head that she was describing. She stated that she could not afford the plaquenil that was sent to the pharmacy because it was $500 and she does not have insurance. I advised her to go to the [facility] to be evaluated and if something was really wrong with her and she needed medical attention they would be able to send her to the hospital.  She stated an understanding.